# Patient Record
Sex: FEMALE | Race: ASIAN
[De-identification: names, ages, dates, MRNs, and addresses within clinical notes are randomized per-mention and may not be internally consistent; named-entity substitution may affect disease eponyms.]

---

## 2018-03-27 ENCOUNTER — HOSPITAL ENCOUNTER (OUTPATIENT)
Dept: HOSPITAL 97 - OR | Age: 53
Discharge: HOME | End: 2018-03-27
Attending: OBSTETRICS & GYNECOLOGY
Payer: COMMERCIAL

## 2018-03-27 DIAGNOSIS — N93.0: ICD-10-CM

## 2018-03-27 DIAGNOSIS — Z82.49: ICD-10-CM

## 2018-03-27 DIAGNOSIS — N84.1: ICD-10-CM

## 2018-03-27 DIAGNOSIS — N95.0: Primary | ICD-10-CM

## 2018-03-27 DIAGNOSIS — Z88.0: ICD-10-CM

## 2018-03-27 PROCEDURE — 0UJD8ZZ INSPECTION OF UTERUS AND CERVIX, VIA NATURAL OR ARTIFICIAL OPENING ENDOSCOPIC: ICD-10-PCS

## 2018-03-27 PROCEDURE — 0UBC7ZX EXCISION OF CERVIX, VIA NATURAL OR ARTIFICIAL OPENING, DIAGNOSTIC: ICD-10-PCS

## 2018-03-27 PROCEDURE — 88305 TISSUE EXAM BY PATHOLOGIST: CPT

## 2018-03-27 PROCEDURE — 0UDB7ZX EXTRACTION OF ENDOMETRIUM, VIA NATURAL OR ARTIFICIAL OPENING, DIAGNOSTIC: ICD-10-PCS

## 2018-03-27 NOTE — OP
Surgeon:  Shabnam Lennon MD



Preoperative Diagnosis:  Postcoital bleeding and postmenopausal bleeding.



Postoperative Diagnosis:  Postcoital bleeding and postmenopausal bleeding.



Procedures Performed:  Cervical polypectomy, hysteroscopy, dilation and curettage.



Anesthesia:  MAC plus paracervical block.



Specimens:  Endometrial curettings and endocervical polyp.



Complications:  None.



Drains:  None.



Condition:  Stable.



Indications:  The patient is a 52-year-old female presented with postcoital bleeding, very minimal sp
otting and is postmenopausal.  Transvaginal ultrasound was done.  On exam, there was a cervical polyp
.  A direct hysteroscopic evaluation and sampling was discussed with the patient and given her postme
nopausal status, I preferred to have MAC for this patient.  As the patient's lower tolerance of exam 
was only moderate.



Description Of Procedure:  After informed consent was verified, the patient was taken back to the OR,
 placed in supine fashion on the operating table.  After MAC was given, she was placed in a dorsal li
thotomy position.  Pelvic exam was performed.  Uterus was found to be small midposition.  Does have s
ome uterine prolapse. 



The cervix was exposed with a bivalve speculum.  Anterior lip injected with 1% lidocaine, mixed with 
1:100,000 epinephrine, 8 cc, and 4 cc each was injected at 4 and 8 o'clock positions at the cervicova
ginal junction for a paracervical block.  Once this was all done and prepped x3 with Betadine was don
e, anterior lip grasped with 2 Allis clamps, direct hysteroscopy with a SlimLine hysteroscope and nor
mal saline for distention medium and 30-degree lens was used to traverse the cervical canal under dir
ect visualization into the uterine cavity.  The cavity was entered.  There were no intracavitary lesi
ons.  The endometrium appeared to be unremarkable.  The left tubal ostium appeared to be masked by sc
ar in this area on the right.  The tubal ostium appeared to be patent.  The scope was then pulled out
.  Endometrial curettings were performed.  Later cervical polypectomy was performed since the polyp w
as protruding at the external os.  This was about half a millimeter cervical polyp, and since the fac
t the patient has postcoital bleeding, I preferred to remove it, and this was done by holding this wi
th a polyp forceps and twisting it all around, it was removed in 2 pieces.  The entire polyp was marcio
miky, and this appeared to be benign. 



All the instruments were removed.  Instrument, needle, and sponge counts were done and were correct a
t the end of the case.  The patient tolerated the procedure well.  She was recovered from anesthesia 
in the OR and taken to PACU in stable condition. 



She will follow up with me in 1 week for postop.





MARILU

DD:  03/27/2018 08:24:12Voice ID:  [id number]

DT:  03/27/2018 18:25:10Report ID:  [id number]